# Patient Record
Sex: FEMALE | Race: AMERICAN INDIAN OR ALASKA NATIVE
[De-identification: names, ages, dates, MRNs, and addresses within clinical notes are randomized per-mention and may not be internally consistent; named-entity substitution may affect disease eponyms.]

---

## 2018-06-22 ENCOUNTER — HOSPITAL ENCOUNTER (EMERGENCY)
Dept: HOSPITAL 5 - ED | Age: 28
LOS: 1 days | Discharge: HOME | End: 2018-06-23
Payer: MEDICAID

## 2018-06-22 DIAGNOSIS — R50.9: ICD-10-CM

## 2018-06-22 DIAGNOSIS — E11.9: ICD-10-CM

## 2018-06-22 DIAGNOSIS — M79.605: ICD-10-CM

## 2018-06-22 DIAGNOSIS — L03.116: Primary | ICD-10-CM

## 2018-06-22 DIAGNOSIS — F17.200: ICD-10-CM

## 2018-06-22 DIAGNOSIS — I10: ICD-10-CM

## 2018-06-22 LAB
ALBUMIN SERPL-MCNC: 3.8 G/DL (ref 3.9–5)
ALT SERPL-CCNC: 17 UNITS/L (ref 7–56)
BASOPHILS # (AUTO): 0 K/MM3 (ref 0–0.1)
BASOPHILS NFR BLD AUTO: 0.3 % (ref 0–1.8)
BILIRUB UR QL STRIP: (no result)
BLOOD UR QL VISUAL: (no result)
BUN SERPL-MCNC: 8 MG/DL (ref 7–17)
BUN/CREAT SERPL: 11 %
CALCIUM SERPL-MCNC: 9.1 MG/DL (ref 8.4–10.2)
EOSINOPHIL # BLD AUTO: 0 K/MM3 (ref 0–0.4)
EOSINOPHIL NFR BLD AUTO: 0.2 % (ref 0–4.3)
HCT VFR BLD CALC: 42.4 % (ref 30.3–42.9)
HEMOLYSIS INDEX: 38
HGB BLD-MCNC: 13.5 GM/DL (ref 10.1–14.3)
INR PPP: 1 (ref 0.87–1.13)
LYMPHOCYTES # BLD AUTO: 1.2 K/MM3 (ref 1.2–5.4)
LYMPHOCYTES NFR BLD AUTO: 8.4 % (ref 13.4–35)
MCH RBC QN AUTO: 30 PG (ref 28–32)
MCHC RBC AUTO-ENTMCNC: 32 % (ref 30–34)
MCV RBC AUTO: 93 FL (ref 79–97)
MONOCYTES # (AUTO): 1 K/MM3 (ref 0–0.8)
MONOCYTES % (AUTO): 6.9 % (ref 0–7.3)
MUCOUS THREADS #/AREA URNS HPF: (no result) /HPF
PH UR STRIP: 5 [PH] (ref 5–7)
PLATELET # BLD: 307 K/MM3 (ref 140–440)
PROT UR STRIP-MCNC: (no result) MG/DL
RBC # BLD AUTO: 4.55 M/MM3 (ref 3.65–5.03)
RBC #/AREA URNS HPF: 1 /HPF (ref 0–6)
UROBILINOGEN UR-MCNC: < 2 MG/DL (ref ?–2)
WBC #/AREA URNS HPF: < 1 /HPF (ref 0–6)

## 2018-06-22 PROCEDURE — 85025 COMPLETE CBC W/AUTO DIFF WBC: CPT

## 2018-06-22 PROCEDURE — 85610 PROTHROMBIN TIME: CPT

## 2018-06-22 PROCEDURE — 96375 TX/PRO/DX INJ NEW DRUG ADDON: CPT

## 2018-06-22 PROCEDURE — 84703 CHORIONIC GONADOTROPIN ASSAY: CPT

## 2018-06-22 PROCEDURE — 93005 ELECTROCARDIOGRAM TRACING: CPT

## 2018-06-22 PROCEDURE — 73590 X-RAY EXAM OF LOWER LEG: CPT

## 2018-06-22 PROCEDURE — 36415 COLL VENOUS BLD VENIPUNCTURE: CPT

## 2018-06-22 PROCEDURE — 87086 URINE CULTURE/COLONY COUNT: CPT

## 2018-06-22 PROCEDURE — 82805 BLOOD GASES W/O2 SATURATION: CPT

## 2018-06-22 PROCEDURE — 73552 X-RAY EXAM OF FEMUR 2/>: CPT

## 2018-06-22 PROCEDURE — 93010 ELECTROCARDIOGRAM REPORT: CPT

## 2018-06-22 PROCEDURE — 71045 X-RAY EXAM CHEST 1 VIEW: CPT

## 2018-06-22 PROCEDURE — 99285 EMERGENCY DEPT VISIT HI MDM: CPT

## 2018-06-22 PROCEDURE — 87040 BLOOD CULTURE FOR BACTERIA: CPT

## 2018-06-22 PROCEDURE — 82140 ASSAY OF AMMONIA: CPT

## 2018-06-22 PROCEDURE — 81001 URINALYSIS AUTO W/SCOPE: CPT

## 2018-06-22 PROCEDURE — 80053 COMPREHEN METABOLIC PANEL: CPT

## 2018-06-22 PROCEDURE — 85379 FIBRIN DEGRADATION QUANT: CPT

## 2018-06-22 PROCEDURE — 96365 THER/PROPH/DIAG IV INF INIT: CPT

## 2018-06-22 NOTE — XRAY REPORT
FINAL REPORT



PROCEDURE:  XR TIBIA FIBULA 2V LT



TECHNIQUE:  LEFT tibia and fibula radiographs, AP and lateral

views. CPT 01468







HISTORY:  leg pain 



COMPARISON:  No prior studies are available for comparison.



FINDINGS:  

Fracture (s) and/or Dislocation(s): None .



Joint space(s): Normal .



Soft tissues: Normal .



Bone mineralization: Normal .



Foreign bodies: None .







IMPRESSION:  

Normal Examination.

## 2018-06-22 NOTE — EMERGENCY DEPARTMENT REPORT
HPI





- General


Chief Complaint: Extremity Injury, Lower


Time Seen by Provider: 18 20:22





- HPI


HPI: 





28-year-old -American female presents to the emergency department with a 

complaint of a 2 day history of left leg swelling and a 1 day history of very 

intense left leg pain.  The pain goes all the way up the leg towards the groin.

  She denies any skin color change.  Patient presents with a fever and says 

that she has been feeling slightly feverish.  She did not complain of any 

shortness of breath or any particular cough but when doing the review of 

systems she says she has an intermittent cough.  The patient is from [place] 

and drove up here for 5 days ago.  No history of DVT but says she has had 

cellulitis before.  No sick contacts at home.  She has not taken anything for 

her symptoms prior to presentation.





ED Past Medical Hx





- Past Medical History


Previous Medical History?: Yes


Hx Hypertension: Yes


Hx Diabetes: Yes


Additional medical history: Morbid Obesity





- Surgical History


Past Surgical History?: Yes


Additional Surgical History:  X 2.





- Social History


Smoking Status: Current Every Day Smoker


Substance Use Type: None





- Medications


Home Medications: 


 Home Medications











 Medication  Instructions  Recorded  Confirmed  Last Taken  Type


 


HYDROcodone/ACETAMINOPHEN [Norco 1 each PO Q6H PRN #12 tablet 18  Unknown 

Rx





5-325 Tablet]     


 


Sulfamethoxazole/Trimethoprim 1 each PO BID #14 tablet 18  Unknown Rx





[Bactrim DS TAB]     














ED Review of Systems


ROS: 


Stated complaint: LEFT LEG PAIN


Other details as noted in HPI





Constitutional: fever.  denies: weakness


Eyes: denies: eye pain, eye discharge, vision change


ENT: denies: ear pain, throat pain


Respiratory: denies: cough, shortness of breath, wheezing


Cardiovascular: edema.  denies: chest pain, palpitations


Gastrointestinal: denies: abdominal pain, nausea, diarrhea


Genitourinary: denies: urgency, dysuria, discharge


Musculoskeletal: arthralgia, myalgia


Skin: denies: rash, lesions


Neurological: denies: headache, weakness, paresthesias





Physical Exam





- Physical Exam


Vital Signs: 


 Vital Signs











  18





  19:27


 


Temperature 101.2 F H


 


Pulse Rate 113 H


 


Blood Pressure 122/77


 


O2 Sat by Pulse 96





Oximetry 











Physical Exam: 





GENERAL: The patient is well-developed well-nourished.


HENT: Normocephalic.  Atraumatic.    Patient has moist mucous membranes.


EYES: Extraocular motions are intact.  Pupils equal reactive to light 

bilaterally.


NECK: Supple.  Trachea is midline.


CHEST/LUNGS: Clear to auscultation.  There is no respiratory distress noted.


HEART/CARDIOVASCULAR: Regular.  There is mild tachycardia.  There is no murmur.


ABDOMEN: Abdomen is soft, nontender.  Patient has normal bowel sounds.  

Morbidly obese habitus.


SKIN: Skin is warm and dry.  There is a small area of erythema to the left 

distal anterior tib-fib.


NEURO: The patient is awake, alert, and oriented.  The patient is cooperative.  

The patient has no focal neurologic deficits.  The patient has normal speech.


MUSCULOSKELETAL: Patient has tenderness to palpation along the left lower 

extremity from the ankle up to the groin.  No palpable abscess, mass or obvious 

hernia.  2+ dorsalis pedis pulse to the affected left lower extremity.  Cap 

refill less than 2 seconds.





ED Course


 Vital Signs











  18





  19:27


 


Temperature 101.2 F H


 


Pulse Rate 113 H


 


Blood Pressure 122/77


 


O2 Sat by Pulse 96





Oximetry 














ED Medical Decision Making





- Lab Data


Result diagrams: 


 18 19:55





 18 19:55





- EKG Data


-: EKG Interpreted by Me


EKG shows normal: sinus rhythm, axis, intervals, QRS complexes (low voltage), ST

-T waves


Rate: tachycardia (108 bpm)





- EKG Data


When compared to previous EKG there are: previous EKG unavailable


Interpretation: other (sinus tachycardia at 108 bpm, low voltage)





- Radiology Data


Radiology results: image reviewed


interpreted by me: 





X-ray of the left tib-fib and femur do not show any fracture, dislocation or 

any acute process.





Chest x-ray does not show any acute process.  There are no pleural effusions, 

obvious pneumonia and there is no pneumothorax.





- Medical Decision Making





This patient came in with a complaint of 2 days of left leg swelling and one of 

pain in that affected leg.  There might be a very small area of erythema to the 

distal portion of the leg that could be cellulitic but I do not believe that is 

the cause of the generalized leg pain.  She presents with a fever.  Labs show a 

leukocytosis of 14,000.  Rest of the labs are unremarkable including a negative 

d-dimer.  Chest x-ray does not show any signs of pneumonia or any acute 

process.  Urinalysis does not show a urinary tract infection.  Patient was 

given some pain medication and antibiotics here.  Since she just traveled here 

from [place] a DVT needs to be ruled out.  D-dimer was negative but I think 

it is prudent to still get the venous Doppler ultrasound.  I am unable to get 

that for her this evening but she has been set up to return tomorrow for the 

venous Doppler ultrasound.  If positive, she will be redirected to the 

emergency department for anticoagulation.  If negative, she needs to follow-up 

with her doctor in [place].  She was sent home with some pain medication and 

antibiotics.  Patient's vitals were improved prior to discharge including 

resolution of the fever.





- Differential Diagnosis


cellulitis, venous stasis, myalgia, DVT


Critical Care Time: No


Critical care attestation.: 


If time is entered above; I have spent that time in minutes in the direct care 

of this critically ill patient, excluding procedure time.








ED Disposition


Clinical Impression: 


 Left leg pain





Fever


Qualifiers:


 Fever type: unspecified Qualified Code(s): R50.9 - Fever, unspecified





Cellulitis


Qualifiers:


 Site of cellulitis: extremity Site of cellulitis of extremity: lower extremity 

Laterality: left Qualified Code(s): L03.116 - Cellulitis of left lower limb





Disposition: DC- TO HOME OR SELFCARE


Is pt being admited?: No


Condition: Stable


Instructions:  Cellulitis (ED), Arthralgia (ED)


Additional Instructions: 


I am sending you up to return tomorrow for a left lower extremity venous 

Doppler ultrasound to rule out a blood clot.  You will be contacted at the 

number given to us by the ultrasound technician.  If it is positive, he will be 

redirected to the emergency department.  If negative, you will need to follow 

up with your primary care physician when you return home to [place].  In the 

meantime, return to the emergency Department with any worsening of your 

symptoms or any acute distress.





You have been prescribed a medication that is sedating and therefore should not 

be taken prior to driving, working, and responsible for children and in no way 

should be mixed with alcohol of any quantity.


Prescriptions: 


HYDROcodone/ACETAMINOPHEN [Norco 5-325 Tablet] 1 each PO Q6H PRN #12 tablet


 PRN Reason: Pain , Severe (7-10)


Sulfamethoxazole/Trimethoprim [Bactrim DS TAB] 1 each PO BID #14 tablet


Referrals: 


PRIMARY CARE,MD [Referring] - ASAP


Time of Disposition: 00:27

## 2018-06-22 NOTE — XRAY REPORT
FINAL REPORT



PROCEDURE:  XR CHEST 1V AP



TECHNIQUE:  Chest radiograph anteroposterior view. CPT 06141







HISTORY:  possible Sepsis 



COMPARISON:  No prior studies are available for comparison.



FINDINGS:  

Heart: Normal.



Mediastinum/Vessels: Normal.



Lungs/Pleural space: Normal.



Bony thorax: No acute osseous abnormality.



Life support devices: None.



IMPRESSION:  

No acute cardiopulmonary abnormality.

## 2018-06-23 ENCOUNTER — HOSPITAL ENCOUNTER (OUTPATIENT)
Dept: HOSPITAL 5 - VAS | Age: 28
Discharge: HOME | End: 2018-06-23
Attending: EMERGENCY MEDICINE
Payer: MEDICAID

## 2018-06-23 VITALS — DIASTOLIC BLOOD PRESSURE: 56 MMHG | SYSTOLIC BLOOD PRESSURE: 112 MMHG

## 2018-06-23 DIAGNOSIS — I10: ICD-10-CM

## 2018-06-23 DIAGNOSIS — M79.605: Primary | ICD-10-CM

## 2018-06-23 DIAGNOSIS — R59.0: ICD-10-CM

## 2018-06-23 DIAGNOSIS — M79.89: ICD-10-CM
